# Patient Record
Sex: FEMALE | Race: WHITE | NOT HISPANIC OR LATINO | Employment: OTHER | URBAN - METROPOLITAN AREA
[De-identification: names, ages, dates, MRNs, and addresses within clinical notes are randomized per-mention and may not be internally consistent; named-entity substitution may affect disease eponyms.]

---

## 2023-09-02 PROBLEM — H00.026 INTERNAL HORDEOLUM OF LEFT EYE: Status: ACTIVE | Noted: 2023-09-02

## 2023-09-02 PROBLEM — R06.02 SHORTNESS OF BREATH: Status: ACTIVE | Noted: 2023-09-02

## 2023-09-02 PROBLEM — R35.1 NOCTURIA: Status: ACTIVE | Noted: 2023-09-02

## 2023-09-02 PROBLEM — N60.19 FIBROCYSTIC BREAST: Status: ACTIVE | Noted: 2023-09-02

## 2023-09-02 PROBLEM — H00.14 CHALAZION OF LEFT UPPER EYELID: Status: ACTIVE | Noted: 2023-09-02

## 2023-09-02 PROBLEM — R07.9 CHEST PAIN: Status: ACTIVE | Noted: 2023-09-02

## 2023-09-02 PROBLEM — E78.5 HYPERLIPIDEMIA: Status: ACTIVE | Noted: 2023-09-02

## 2023-09-02 PROBLEM — H01.00A BLEPHARITIS OF BOTH UPPER AND LOWER EYELID OF RIGHT EYE: Status: ACTIVE | Noted: 2023-09-02

## 2023-09-02 PROBLEM — G47.00 INSOMNIA: Status: ACTIVE | Noted: 2023-09-02

## 2023-09-02 PROBLEM — N95.1 MENOPAUSAL SYMPTOMS: Status: ACTIVE | Noted: 2023-09-02

## 2023-09-02 PROBLEM — M62.81 MUSCLE WEAKNESS (GENERALIZED): Status: ACTIVE | Noted: 2023-09-02

## 2023-09-02 PROBLEM — M11.262 CHONDROCALCINOSIS OF LEFT KNEE: Status: ACTIVE | Noted: 2023-09-02

## 2023-09-02 PROBLEM — M11.20 CALCIUM PYROPHOSPHATE DEPOSITION DISEASE: Status: ACTIVE | Noted: 2023-09-02

## 2023-09-02 PROBLEM — R32 URINE INCONTINENCE: Status: ACTIVE | Noted: 2023-09-02

## 2023-09-02 PROBLEM — M16.32 OSTEOARTHRITIS OF LEFT HIP JOINT DUE TO DYSPLASIA: Status: ACTIVE | Noted: 2023-09-02

## 2023-09-02 PROBLEM — R53.83 FATIGUE: Status: ACTIVE | Noted: 2023-09-02

## 2023-09-02 PROBLEM — R77.8 ABNORMAL SPEP: Status: ACTIVE | Noted: 2023-09-02

## 2023-09-02 PROBLEM — M21.70 LEG LENGTH DISCREPANCY: Status: ACTIVE | Noted: 2023-09-02

## 2023-09-02 PROBLEM — E55.9 VITAMIN D DEFICIENCY: Status: ACTIVE | Noted: 2023-09-02

## 2023-09-02 PROBLEM — K21.00 GASTROESOPHAGEAL REFLUX DISEASE WITH ESOPHAGITIS: Status: ACTIVE | Noted: 2023-09-02

## 2023-09-02 PROBLEM — H01.00B BLEPHARITIS OF BOTH UPPER AND LOWER EYELID OF LEFT EYE: Status: ACTIVE | Noted: 2023-09-02

## 2023-09-02 PROBLEM — F43.9 STRESS: Status: ACTIVE | Noted: 2023-09-02

## 2023-09-02 PROBLEM — Z96.641 STATUS POST RIGHT HIP REPLACEMENT: Status: ACTIVE | Noted: 2023-09-02

## 2023-09-02 PROBLEM — D72.819 LEUKOPENIA: Status: ACTIVE | Noted: 2023-09-02

## 2023-09-02 PROBLEM — T70.20XA MOUNTAIN SICKNESS: Status: ACTIVE | Noted: 2023-09-02

## 2023-09-02 PROBLEM — M17.12 PRIMARY OSTEOARTHRITIS OF LEFT KNEE: Status: ACTIVE | Noted: 2023-09-02

## 2023-09-02 RX ORDER — FAMOTIDINE 20 MG/1
1 TABLET, FILM COATED ORAL 2 TIMES DAILY
COMMUNITY
Start: 2019-10-02 | End: 2024-04-25

## 2023-09-02 RX ORDER — BUPROPION HYDROCHLORIDE 300 MG/1
1 TABLET ORAL DAILY
COMMUNITY
Start: 2022-04-13

## 2023-09-02 RX ORDER — ESTRADIOL 10 UG/1
10 INSERT VAGINAL 2 TIMES WEEKLY
COMMUNITY
Start: 2022-09-16 | End: 2023-12-08

## 2023-09-02 RX ORDER — ROSUVASTATIN CALCIUM 5 MG/1
1 TABLET, COATED ORAL EVERY OTHER DAY
COMMUNITY
Start: 2017-04-12

## 2023-09-02 RX ORDER — SERTRALINE HYDROCHLORIDE 25 MG/1
25 TABLET, FILM COATED ORAL DAILY
COMMUNITY
Start: 2021-04-07

## 2023-10-10 ENCOUNTER — ALLIED HEALTH (OUTPATIENT)
Dept: INTEGRATIVE MEDICINE | Facility: CLINIC | Age: 73
End: 2023-10-10
Payer: COMMERCIAL

## 2023-10-10 PROCEDURE — MASSPFU MASSAGE PACKAGE FOLLOW UP: Performed by: MASSAGE THERAPIST

## 2023-10-10 NOTE — PROGRESS NOTES
Massage Therapy Visit:     jennie Mcgill was self-referred.    Condition of Client Subjective :  Patient ID: jennie Mcgill is a 73 y.o. female who presents for reason for visit of Relaxation massage.  HPI    Session Information  Visit Type: Follow-up visit  Description of present complaint: Muscle tension, Stress    Review of Systems    Objective        Physical Exam    Actions Assessment/Plan :    Massage Treatment  Patient Position: Supine, Table, Prone  Positioning Assistance: Did not need assistance, Pillow(s)/bolster under knees while supine, Pillow(s)/bolster under anles while prone  Massage Technique: Myofascial release, Relaxation massage  Pressure Scale: 3 - Medium pressure, 4 - Moderate pressure, 5 - Deep pressure  Action Note: Relaxation massage requested, for stress management.                                                                                               50 minute full body massage, supine/prone.  Medium to firm pressure.  Relaxation protocol                                Supine:  Cervicals/upper body: kneading, stripping, palming, effleurage, petrissage, cross fiber, on SCM, SITS, levators, traps, rhomboids, upper pecs, deltoids.  UE: Stretching, cross fiber, palming, kneading, effleurage on deltoids, biceps, triceps, forearms, hands.  LE: Kneading, stripping, palming, effleurage, petrissage, cross fiber, stretching: vastus group, TFL, hamstrings... gastrocnemius, soleus, tibialis, plantar.                                              Prone:   Kneading, stripping, effleurage, petrissage, cross fiber, on erectors, longissimus, rhomboids, obliques, SI joint, paraspinals, SITS, cervicals, traps.    Response:  Post-treatment Assessment  Patient Fell Asleep During Treatment: No  Patient Noted Improvement of the Following Symptoms: Muscle tension    Evaluation:   Massage Therapy Evaluation / Recommendation(s) / Follow-up  Post-Treatment Recommendation: Increase hydration, stretching.   Cpontinue with swimming when possible, walking.  Follow-up: Follow up scheduled, 2 weeks.

## 2023-10-24 ENCOUNTER — APPOINTMENT (OUTPATIENT)
Dept: INTEGRATIVE MEDICINE | Facility: CLINIC | Age: 73
End: 2023-10-24
Payer: MEDICARE

## 2023-11-06 ENCOUNTER — ANCILLARY PROCEDURE (OUTPATIENT)
Dept: RADIOLOGY | Facility: CLINIC | Age: 73
End: 2023-11-06
Payer: MEDICARE

## 2023-11-06 VITALS — WEIGHT: 171.08 LBS | BODY MASS INDEX: 24.49 KG/M2 | HEIGHT: 70 IN

## 2023-11-06 DIAGNOSIS — Z12.31 ENCOUNTER FOR SCREENING MAMMOGRAM FOR MALIGNANT NEOPLASM OF BREAST: ICD-10-CM

## 2023-11-06 PROCEDURE — 77067 SCR MAMMO BI INCL CAD: CPT | Mod: BILATERAL PROCEDURE | Performed by: RADIOLOGY

## 2023-11-06 PROCEDURE — 77063 BREAST TOMOSYNTHESIS BI: CPT | Mod: BILATERAL PROCEDURE | Performed by: RADIOLOGY

## 2023-11-06 PROCEDURE — 77063 BREAST TOMOSYNTHESIS BI: CPT

## 2023-11-07 ENCOUNTER — ALLIED HEALTH (OUTPATIENT)
Dept: INTEGRATIVE MEDICINE | Facility: CLINIC | Age: 73
End: 2023-11-07

## 2023-11-07 PROCEDURE — MASS(60M) MASSAGE 60 MINUTES: Performed by: MASSAGE THERAPIST

## 2023-11-07 NOTE — PROGRESS NOTES
Massage Therapy Visit:     jennie Mcgill was self-referred.    Condition of Client Subjective :  Patient ID: jennie Mcgill is a 73 y.o. female who presents for reason for visit of relaxation massage.  HPI    Session Information  Visit Type: Follow-up visit  Description of present complaint: Chronic pain, Muscle tension, Stress, Discomfort      Review of Systems    Objective        Physical Exam    Actions Assessment/Plan :      Massage Treatment  Patient Position: Table, Supine, Prone  Positioning Assistance: Did not need assistance, Pillow(s)/bolster under knees while supine, Pillow(s)/bolster under anles while prone  Massage Technique: Relaxation massage, Soft tissue mobilization  Pressure Scale: 2 - Mild pressure, 3 - Medium pressure, 4 - Moderate pressure  Action Note: Relaxation massage requested for Stress Management.    50 minute full body massage, supine/prone.  Light/Medium/Firm pressure.   Relaxation protocol.    Supine:  Cervical/upper body, kneading, stripping, palming, effleurage, petrissage, cross fiber, on Platysma, SCM, SITS, Levator Scapulae, Trapezius, Rhomboids, upper Pectoralis, Deltoids.  UE, stretching, cross fiber, palming, kneading, effleurage, on Deltoids, Biceps, Triceps, forearm muscles, hand muscles.  LE, kneading, stripping, palming, knuckle, effleurage, petrissage, cross fiber, stretching, on Vastus group, TFL, lateral approach to Hip Flexors, Hamstrings, Sartorius, Gracilis, Gastrocnemius, Soleus, Tibialis, plantar/dorsal aspect of foot.  Prone, kneading, stripping, palming, knuckle, effleurage, petrissage, cross fiber, stretching, on SI joint, erectors, Longissimus, QL, Obliques, paraspinals, SITS. Cervicals, Trapezius.    Response:  Post-treatment Assessment  Patient Fell Asleep During Treatment: No  Patient Noted Improvement of the Following Symptoms: Muscle tension    Evaluation:   Massage Therapy Evaluation / Recommendation(s) / Follow-up  Post-Treatment Recommendation:  Increase hydration  Follow-up: Follow up as scheduled.

## 2023-11-22 ENCOUNTER — ALLIED HEALTH (OUTPATIENT)
Dept: INTEGRATIVE MEDICINE | Facility: CLINIC | Age: 73
End: 2023-11-22

## 2023-11-22 PROCEDURE — MASSPFU MASSAGE PACKAGE FOLLOW UP: Performed by: MASSAGE THERAPIST

## 2023-11-22 NOTE — PROGRESS NOTES
Massage Therapy Visit:     Isabella Mcgill was self-referred.    Condition of Client Subjective :  Patient ID: isabella Mcgill is a 73 y.o. female who presents for reason for visit of relaxation.  [facility]    Session Information  Visit Type: Follow-up visit  Description of present complaint: Muscle tension, Stress, Discomfort    Review of Systems    Objective        Physical Exam    Actions Assessment/Plan :    Massage Treatment  Patient Position: Table, Supine, Prone  Positioning Assistance: Did not need assistance, Pillow(s)/bolster under anles while prone, Pillow(s)/bolster under knees while supine  Massage Technique: Relaxation massage, Soft tissue mobilization, Stretching, Superficial fascial release, Myofascial release  Pressure Scale: 1 - Light pressure, 2 - Mild pressure, 3 - Medium pressure, 4 - Moderate pressure  Action Note: Relaxation massage requested for Stress Management.    50 minute full body massage, supine/prone.  Light/Medium/Firm pressure.  Therapeutic pressure. MFR techniques.  Relaxation protocol.    Supine:  Cervical/upper body, kneading, stripping, palming, effleurage, petrissage, cross fiber, on Platysma, SCM, SITS, Levator Scapulae, Trapezius, Rhomboids, upper Pectoralis, Deltoids.  UE, stretching, cross fiber, palming, kneading, effleurage, on Deltoids, Biceps, Triceps, forearm muscles, hand muscles.  LE, kneading, stripping, palming, knuckle, effleurage, petrissage, cross fiber, stretching, on Vastus group, TFL, lateral approach to Hip Flexors, Hamstrings, Sartorius, Gracilis, Gastrocnemius, Soleus, Tibialis, plantar/dorsal aspect of foot.  Prone, kneading, stripping, palming, knuckle, effleurage, petrissage, cross fiber, stretching, on SI joint, erectors, Longissimus, QL, Obliques, paraspinals, SITS. Cervical, Trapezius.    Response:  Post-treatment Assessment  Patient Fell Asleep During Treatment: No  Patient Noted Improvement of the Following Symptoms: Muscle tension, ROM    Evaluation:    Massage Therapy Evaluation / Recommendation(s) / Follow-up  Post-Treatment Recommendation: Increase hydration.  Follow-up: Follow up as scheduled.

## 2023-11-26 DIAGNOSIS — N95.2 POSTMENOPAUSAL ATROPHIC VAGINITIS: ICD-10-CM

## 2023-12-05 ENCOUNTER — ALLIED HEALTH (OUTPATIENT)
Dept: INTEGRATIVE MEDICINE | Facility: CLINIC | Age: 73
End: 2023-12-05
Payer: MEDICARE

## 2023-12-05 PROCEDURE — MASS(5P): Performed by: MASSAGE THERAPIST

## 2023-12-05 PROCEDURE — MASSPFU MASSAGE PACKAGE FOLLOW UP: Performed by: MASSAGE THERAPIST

## 2023-12-05 PROCEDURE — MASS60I MASSAGE 5 PK 60 INITIAL VISIT: Performed by: MASSAGE THERAPIST

## 2023-12-05 NOTE — PROGRESS NOTES
Massage Therapy Visit:     Isabella Mcgill was self-referred.    Condition of Client Subjective :  Patient ID: isabella Mcgill is a 73 y.o. female who presents for reason for visit of relaxation.  HPI    Session Information  Visit Type: Follow-up visit  Description of present complaint: Discomfort, Stress, Muscle tension      Review of Systems    Objective        Physical Exam    Actions Assessment/Plan :  Provider reviewed plan for the massage session, precautions and contraindications. Patient has given consent to treat with full understanding of what to expect during the session. Before massage therapy began, provider explained to the patient to communicate at any time if the pressure was causing discomfort past their tolerance level. Patient agreed to advise therapist.    Massage Treatment  Patient Position: Prone, Supine, Table  Positioning Assistance: Did not need assistance, Pillow(s)/bolster under anles while prone, Pillow(s)/bolster under knees while supine  Massage Technique: Cranio-sacral therapy, Myofascial release, Nurturing touch, Positional release, Relaxation massage, Soft tissue mobilization, Stretching  Pressure Scale: 2 - Mild pressure, 3 - Medium pressure, 4 - Moderate pressure    Response:  Post-treatment Assessment  Patient Fell Asleep During Treatment: No  Patient Noted Improvement of the Following Symptoms: Muscle tension, ROM    Evaluation:   Massage Therapy Evaluation / Recommendation(s) / Follow-up  Post-Treatment Recommendation: Increase hydration  Follow-up: Follow up as scheduled.

## 2023-12-08 RX ORDER — ESTRADIOL 10 UG/1
TABLET VAGINAL
Qty: 24 TABLET | Refills: 1 | Status: SHIPPED | OUTPATIENT
Start: 2023-12-08

## 2023-12-19 ENCOUNTER — ALLIED HEALTH (OUTPATIENT)
Dept: INTEGRATIVE MEDICINE | Facility: CLINIC | Age: 73
End: 2023-12-19

## 2023-12-19 PROCEDURE — MASSPFU MASSAGE PACKAGE FOLLOW UP: Performed by: MASSAGE THERAPIST

## 2023-12-19 NOTE — PROGRESS NOTES
Massage Therapy Visit:     Isabella Mcgill was self-referred.    Condition of Client Subjective :  Patient ID: isabella Mcgill is a 73 y.o. female who presents for reason for visit of relaxation.  HPI    Session Information  Visit Type: Follow-up visit  Description of present complaint: Muscle tension, Discomfort, Range of motion (ROM), Chronic pain      Review of Systems    Objective        Physical Exam    Actions Assessment/Plan :  Provider reviewed plan for the massage session, precautions and contraindications. Patient has given consent to treat with full understanding of what to expect during the session. Before massage therapy began, provider explained to the patient to communicate at any time if the pressure was causing discomfort past their tolerance level. Patient agreed to advise therapist.    Massage Treatment  Patient Position: Prone, Supine, Table  Positioning Assistance: Did not need assistance, Pillow(s)/bolster under anles while prone, Pillow(s)/bolster under knees while supine  Massage Technique: Stretching, Therapeutic massage, Relaxation massage, Myofascial release  Pressure Scale: 2 - Mild pressure, 3 - Medium pressure, 4 - Moderate pressure    Response:  Post-treatment Assessment  Patient Fell Asleep During Treatment: No    Evaluation:   Massage Therapy Evaluation / Recommendation(s) / Follow-up  Post-Treatment Recommendation: Increase hydration  Follow-up: Follow up as scheduled

## 2024-01-10 ENCOUNTER — APPOINTMENT (OUTPATIENT)
Dept: INTEGRATIVE MEDICINE | Facility: CLINIC | Age: 74
End: 2024-01-10

## 2024-01-24 ENCOUNTER — ALLIED HEALTH (OUTPATIENT)
Dept: INTEGRATIVE MEDICINE | Facility: CLINIC | Age: 74
End: 2024-01-24

## 2024-01-24 PROCEDURE — MASSPFU MASSAGE PACKAGE FOLLOW UP: Performed by: MASSAGE THERAPIST

## 2024-01-24 NOTE — PROGRESS NOTES
Massage Therapy Visit:     Isabella Mcgill was self-referred.    Condition of Client Subjective :  Patient ID: isabella Mcgill is a 73 y.o. female who presents for reason for visit of Stress relief.  HPI    Session Information  Visit Type: Follow-up visit  Description of present complaint: Muscle tension, Anxiety, Discomfort, Stress, Range of motion (ROM)      Review of Systems    Objective        Physical Exam    Actions Assessment/Plan :    Massage Treatment  Patient Position: Table, Supine, Prone  Positioning Assistance: Did not need assistance, Pillow(s)/bolster under anles while prone, Pillow(s)/bolster under knees while supine  Massage Technique: Cranio-sacral therapy, Mobilization, Myofascial release, Nurturing touch, Positional release, Relaxation massage, Soft tissue mobilization, Stretching, Therapeutic massage  Pressure Scale: 1 - Light pressure, 2 - Mild pressure, 3 - Medium pressure, 4 - Moderate pressure    Response:  Post-treatment Assessment  Patient Fell Asleep During Treatment: No    Evaluation:   Massage Therapy Evaluation / Recommendation(s) / Follow-up  Follow-up: Follow up as needed

## 2024-02-20 ENCOUNTER — APPOINTMENT (OUTPATIENT)
Dept: INTEGRATIVE MEDICINE | Facility: CLINIC | Age: 74
End: 2024-02-20

## 2024-03-05 ENCOUNTER — ALLIED HEALTH (OUTPATIENT)
Dept: INTEGRATIVE MEDICINE | Facility: CLINIC | Age: 74
End: 2024-03-05

## 2024-03-05 PROCEDURE — MASSPFU MASSAGE PACKAGE FOLLOW UP: Performed by: MASSAGE THERAPIST

## 2024-03-05 NOTE — PROGRESS NOTES
Massage Therapy Visit:     Isabella Mcgill was self-referred.    Condition of Client Subjective :  Patient ID: isabella Mcgill is a 73 y.o. female who presents for reason for visit of Relaxation.  HPI    Session Information  Visit Type: Follow-up visit  Description of present complaint: Stress, Muscle tension, Discomfort, Fatigue    Review of Systems    Objective        Physical Exam    Actions Assessment/Plan :    Massage Treatment  Patient Position: Table, Supine, Prone  Positioning Assistance: Pillow(s)/bolster under knees while supine, Pillow(s)/bolster under anles while prone, Did not need assistance  Massage Technique: Therapeutic massage, Stretching, Relaxation massage, Myofascial release  Pressure Scale: 2 - Mild pressure, 3 - Medium pressure, 4 - Moderate pressure  Action Note: Cervical/upper body, kneading, stripping, palming, effleurage, petrissage, cross fiber, on Platysma, SCM, SITS, Levator Scapulae, Trapezius, Rhomboids, upper Pectoralis, Deltoids.  UE, stretching, cross fiber, palming, kneading, effleurage, on Deltoids, Biceps, Triceps, forearm muscles, hand muscles.  LE, kneading, stripping, palming, knuckle, effleurage, petrissage, cross fiber, stretching, on Vastus group, TFL, lateral approach to Hip Flexors, Hamstrings, Sartorius, Gracilis, Gastrocnemius, Soleus, Tibialis, plantar/dorsal aspect of foot.  Prone, kneading, stripping, palming, knuckle, effleurage, petrissage, cross fiber, stretching, on SI joint, erectors, Longissimus, QL, Obliques, paraspinals, SITS.    Response:  Post-treatment Assessment  Patient Fell Asleep During Treatment: Yes  Patient Noted Improvement of the Following Symptoms: Muscle tension, ROM    Evaluation:   Massage Therapy Evaluation / Recommendation(s) / Follow-up  Post-Treatment Recommendation: Increase hydration  Follow-up: Follow up scheduled.

## 2024-03-11 ENCOUNTER — APPOINTMENT (OUTPATIENT)
Dept: ORTHOPEDIC SURGERY | Facility: CLINIC | Age: 74
End: 2024-03-11
Payer: MEDICARE

## 2024-03-11 ENCOUNTER — HOSPITAL ENCOUNTER (OUTPATIENT)
Dept: RADIOLOGY | Facility: CLINIC | Age: 74
Discharge: HOME | End: 2024-03-11
Payer: MEDICARE

## 2024-03-11 DIAGNOSIS — M25.552 LEFT HIP PAIN: ICD-10-CM

## 2024-03-11 PROCEDURE — 73502 X-RAY EXAM HIP UNI 2-3 VIEWS: CPT | Mod: LEFT SIDE | Performed by: RADIOLOGY

## 2024-03-11 PROCEDURE — 73502 X-RAY EXAM HIP UNI 2-3 VIEWS: CPT | Mod: LT

## 2024-03-18 ENCOUNTER — OFFICE VISIT (OUTPATIENT)
Dept: ORTHOPEDIC SURGERY | Facility: CLINIC | Age: 74
End: 2024-03-18
Payer: MEDICARE

## 2024-03-18 VITALS — HEIGHT: 69 IN | BODY MASS INDEX: 23.7 KG/M2 | WEIGHT: 160 LBS

## 2024-03-18 DIAGNOSIS — Z96.641 HISTORY OF TOTAL HIP ARTHROPLASTY, RIGHT: Primary | ICD-10-CM

## 2024-03-18 DIAGNOSIS — M25.552 LEFT HIP PAIN: ICD-10-CM

## 2024-03-18 PROCEDURE — 99215 OFFICE O/P EST HI 40 MIN: CPT | Performed by: STUDENT IN AN ORGANIZED HEALTH CARE EDUCATION/TRAINING PROGRAM

## 2024-03-18 PROCEDURE — 1159F MED LIST DOCD IN RCRD: CPT | Performed by: STUDENT IN AN ORGANIZED HEALTH CARE EDUCATION/TRAINING PROGRAM

## 2024-03-18 PROCEDURE — 1036F TOBACCO NON-USER: CPT | Performed by: STUDENT IN AN ORGANIZED HEALTH CARE EDUCATION/TRAINING PROGRAM

## 2024-03-18 NOTE — PROGRESS NOTES
JOHN/TKA Related Summary           L hip: N  L knee: N  R hip  ~2015: Primary JOHN (Dr. Robles at ). Doing well.  R knee: N  Lumbar surgery or significant pathology: N            CC/SUBJECTIVE/HPI            PCP: Kishan Boudreaux MD  Referring provider: No ref. provider found  Occupation: Retired  Hobbies: Read, walking, swimming, exercising  Isabella Mcgill is a very pleasant 73 y.o. female presenting for L hip pain.  She has a surgical history notable for a right total hip arthroplasty about 10 years ago that is doing well.  This time around, the pain is similar in nature and location in her groin but is not as severe.  She has been using over-the-counter medications with some relief.    L hip  Symptoms  Pain: 4/10  Onset: chronic/gradual  Duration: 3mo-1yr  Location: groin  Quality: dull/ache  Limitations: pain after activity and limp  Ambulation limit due to pain: unlimited but hurts later  Other symptoms: none  Treatment  Tried: activity modification, home exercises, ice/heat, and OTCs  Most recent injection: none  Assistive device: none  Treatment attempted for 3mo-1yr and is partially effective            HISTORIES (System Generated and Pt Reported on Form Today)       Dental  Pt reported: No active issues     PMH  Pt reported: Denies heart/lung/kidney/liver issues, DM, stroke, seizure, bariatric surgery, anticoag, MRSA, cancer, personal/familial coagulopathies except: none in addition to below  System generated (PMH, problem list both included since EMR change caused discrepancies):   Past Medical History:   Diagnosis Date    Asymptomatic menopausal state 03/30/2018    Post-menopausal    Encounter for gynecological examination (general) (routine) without abnormal findings 02/23/2021    Well woman exam with routine gynecological exam    Personal history of other specified conditions 10/02/2019    History of dysphagia    Personal history of other specified conditions     History of fibrocystic disease of  breast    Personal history of pneumonia (recurrent)     History of pneumococcal pneumonia     Patient Active Problem List   Diagnosis    Abnormal SPEP    Blepharitis of both upper and lower eyelid of left eye    Blepharitis of both upper and lower eyelid of right eye    Calcium pyrophosphate deposition disease    Chalazion of left upper eyelid    Chest pain    Chondrocalcinosis of left knee    Primary osteoarthritis of left knee    Fatigue    Fibrocystic breast    Gastroesophageal reflux disease with esophagitis    Hyperlipidemia    Insomnia    Internal hordeolum of left eye    Leg length discrepancy    Leukopenia    Menopausal symptoms    Mountain sickness    Muscle weakness (generalized)    Nocturia    Osteoarthritis of left hip joint due to dysplasia    Shortness of breath    Status post right hip replacement    Stress    Urine incontinence    Vitamin D deficiency     PSH  Pt reported: Per above.   System generated:   Past Surgical History:   Procedure Laterality Date    APPENDECTOMY  03/30/2018    Appendectomy    MR HEAD ANGIO WO IV CONTRAST  10/1/2021    MR HEAD ANGIO WO IV CONTRAST 10/1/2021 [facility]    MR NECK ANGIO WO IV CONTRAST  10/1/2021    MR NECK ANGIO WO IV CONTRAST 10/1/2021 [facility]    OTHER SURGICAL HISTORY  02/13/2019    Hip surgery    OTHER SURGICAL HISTORY  08/14/2019    Rhinologic surgery    OTHER SURGICAL HISTORY  08/14/2019    [place] tooth extraction    TONSILLECTOMY  03/30/2018    Tonsillectomy     Family Hx  Pt reported clot/coagulopathies: none  System generated:   Family History   Problem Relation Name Age of Onset    Cataracts Mother      Other (Myositis) Father      Thyroid cancer Sister       Social Hx  Pt reported substance use: N  System generated:      Allergies  Pt reported (meds, metals): per below  System generated:   Allergies   Allergen Reactions    Esomeprazole Headache    Ibuprofen Itching     Current Meds  System generated:   Current Outpatient  "Medications:     buPROPion XL (Wellbutrin XL) 300 mg 24 hr tablet, Take 1 tablet (300 mg) by mouth once daily., Disp: , Rfl:     famotidine (Pepcid) 20 mg tablet, Take 1 tablet (20 mg) by mouth 2 times a day., Disp: , Rfl:     rosuvastatin (Crestor) 5 mg tablet, Take 1 tablet (5 mg) by mouth every other day., Disp: , Rfl:     sertraline (Zoloft) 25 mg tablet, Take 1 tablet (25 mg) by mouth once daily., Disp: , Rfl:     Yuvafem 10 mcg tablet vaginal tablet, INSERT 1 TABLET PER VAGINA TWICE WEEKLY, Disp: 24 tablet, Rfl: 1    ROS: Neg except HPI            OBJECTIVE            Physical exam  Estimated body mass index is 24.55 kg/m² as calculated from the following:    Height as of 11/6/23: 1.778 m (5' 10\").    Weight as of 11/6/23: 77.6 kg (171 lb 1.2 oz).  Gen/psych: NAD, conversational, appropriate    Ambulation  Gait: normal  Assistive device: none  Spine  Lumbar tenderness: neg  Limited ROM: neg, with no radiation or pain with flex/ex, lateral bending  SLR test: neg    Focused MSK exam: L  Hip  Trendelenberg test: neg  Tenderness: none  Pain with log roll: neg  Stinchfield: neg  ROM: within expected range, nonpainful except at extremes  Flexion: 110º  IR: 10º  ER: 40º  Abd: 30º  Neurovascular    Strength: 5/5 hip/knee/ankle flexion and extension  Sensory (L2-S1): SILT throughout lower extremity  Edema/stasis: no pitting edema  Pulse: DP 1+, PT 1+    Focused MSK exam: R  JOHN  Trendelenberg test: neg  Skin/incision: well healed, likely posterior approach  Tenderness: none  Pain with log roll: neg  ROM: within expected range, nonpainful   Neurovascular    Strength: 5/5 hip/knee/ankle flexion and extension  Sensory (L2-S1): SILT throughout lower extremity  Edema/stasis: no pitting edema  Pulse: DP 1+, PT 1+    Leg lengths: Objectively and subjectively R 1cm longer than L     Imaging  3/18/2024 L hip radiographs (AP Pelvis, AP/Lateral) on my read: Joint space narrowing (mild) with mild undercoverage/dysplasia.  5/4/2022 " R hip radiographs (AP Pelvis, AP/Lateral) on my read: JOHN components in acceptable position with no sign of gross implant/hardware failure.            ASSESSMENT & PLAN           Patient verbalized understanding of below A&P. All questions answered.  R Primary JOHN (Dr. Robles, ~2015), doing well  Imaging and outcomes reviewed with pt.  Activity/Therapy/Incision: Continue low impact exercise and weight mgmt.  Pain: OTCs, ice as needed.  Follow-up: 5yrs from most recent xrays with repeat XRs (2027)  L hip DJD  Differential includes radicular pain from spine. H&P most consistent with intra-articular pain from hip degeneration.  General information  Evaluation, imaging, diagnosis, and treatment options (conservative and surgical as well as risks, benefits, recovery, and outcomes) discussed. Conservative options should be maximized and include activity modification, bracing, weight loss, PT, home exercise, local pain control (ice, heat, topicals), OTCs, and assistive devices. Once exhausted, injections may provide relief. If these fail to improve pain, function, and quality of life, elective arthroplasty may be an option.  Shared decision making   At this point, Isabella's pain has been reasonably controlled with over-the-counter medications.  Thus, we will continue this conservative treatment.  If she does progress to the point of desiring a corticosteroid injection, she can call the office for referral and does not need to return for an appointment.  PMH, PSH, other considerations discussed  SDD: No absolute criteria exist, but pt meets general guidelines (BMI<40, no more than cane preop, no recent falls, home help, no cardopulm dz requiring postop monitoring, no untreated FATIMAH, no hx anesthesia issues, <=76yo)  Chondrocalcinosis  Leg length discrepancy  Listed in EMR but not endorsed by patient today: Generalized muscle weakness, leukopenia (WBC 4.7 on 10/12/2023)  Follow-up: As needed.

## 2024-03-19 ENCOUNTER — ALLIED HEALTH (OUTPATIENT)
Dept: INTEGRATIVE MEDICINE | Facility: CLINIC | Age: 74
End: 2024-03-19

## 2024-03-19 PROCEDURE — MASSPFU MASSAGE PACKAGE FOLLOW UP: Performed by: MASSAGE THERAPIST

## 2024-03-19 NOTE — PROGRESS NOTES
Massage Therapy Visit:     Isabella Mcgill was self-referred.    Condition of Client Subjective :  Patient ID: isabella Mcgill is a 73 y.o. female who presents for reason for visit of Relaxation.  HPI    Session Information  Visit Type: Follow-up visit  Description of present complaint: Muscle tension, Discomfort, Stress, Fatigue, Range of motion (ROM)    Review of Systems    Objective        Physical Exam    Actions Assessment/Plan :    Massage Treatment  Patient Position: Table, Supine, Prone  Positioning Assistance: Pillow(s)/bolster under knees while supine, Pillow(s)/bolster under anles while prone, Did not need assistance  Massage Technique: Therapeutic massage, Relaxation massage, Myofascial release, Soft tissue mobilization, Stretching  Pressure Scale: 2 - Mild pressure, 3 - Medium pressure, 4 - Moderate pressure  Action Note: Cervical/upper body, kneading, stripping, palming, effleurage, petrissage, cross fiber, on SCM, SITS, Levator Scapulae, Trapezius, Rhomboids, Deltoids.  UE, stretching, cross fiber, palming, kneading, effleurage, on Deltoids, Biceps, Triceps, forearm muscles, hand muscles.  LE, kneading, stripping, palming, knuckle, effleurage, petrissage, cross fiber, stretching, on Vastus group, TFL, Hamstrings, Sartorius, Gracilis, Gastrocnemius, Soleus, Tibialis, plantar/dorsal aspect of foot.  Prone, kneading, stripping, palming, knuckle, effleurage, petrissage, cross fiber, stretching, on erectors, Longissimus, QL, Obliques, paraspinals, SITS.    Response:  Post-treatment Assessment  Patient Fell Asleep During Treatment: Yes  Patient Noted Improvement of the Following Symptoms: Muscle tension    Evaluation:   Massage Therapy Evaluation / Recommendation(s) / Follow-up  Post-Treatment Recommendation: Increase hydration  Follow-up: Follow up scheduled.

## 2024-04-03 ENCOUNTER — APPOINTMENT (OUTPATIENT)
Dept: INTEGRATIVE MEDICINE | Facility: CLINIC | Age: 74
End: 2024-04-03

## 2024-04-16 ENCOUNTER — ALLIED HEALTH (OUTPATIENT)
Dept: INTEGRATIVE MEDICINE | Facility: CLINIC | Age: 74
End: 2024-04-16

## 2024-04-16 PROCEDURE — MASS60I MASSAGE 5 PK 60 INITIAL VISIT: Performed by: MASSAGE THERAPIST

## 2024-04-16 NOTE — PROGRESS NOTES
Massage Therapy Visit:     Isabella Mcgill was self-referred.    Condition of Client Subjective :  Patient ID: isabella Mcgill is a 73 y.o. female who presents for reason for visit of Relaxation massage.  HPI    Session Information  Visit Type: Follow-up visit  Description of present complaint: Fatigue, Stress, Anxiety, Range of motion (ROM), Discomfort    Review of Systems    Objective   Pre-treatment Assessment  Condition of Client Note: Tension felt on neck, shoulders, Lumbar.    Physical Exam    Actions Assessment/Plan :    Massage Treatment  Patient Position: Table, Supine, Prone  Positioning Assistance: Did not need assistance, Pillow(s)/bolster under knees while supine, Pillow(s)/bolster under anles while prone  Massage Technique: Therapeutic massage, Superficial fascial release, Myofascial release, Stretching, Soft tissue mobilization, Relaxation massage  Pressure Scale: 2 - Mild pressure, 3 - Medium pressure, 4 - Moderate pressure  Action Note: Cervical/upper body, kneading, stripping, palming, effleurage, petrissage, cross fiber, on scalp, facials, Cervicals, Platysma, SCM, SITS, Levator Scapulae, Trapezius, Rhomboids, upper Pectoralis, Deltoids.  UE, stretching, cross fiber, palming, kneading, effleurage, on Deltoids, Biceps, Triceps, forearm muscles, hand muscles.  LE, kneading, stripping, palming, knuckle, effleurage, petrissage, cross fiber, stretching, on Vastus group, TFL, lateral approach to Hip Flexors, Lumbars, QL, Hamstrings, Sartorius, Gracilis, Gastrocnemius, Soleus, Tibialis, plantar/dorsal aspect of foot.  Prone, kneading, stripping, palming, knuckle, effleurage, petrissage, cross fiber, stretching, on SI joint, erectors, Longissimus, QL, Obliques, paraspinals, SITS.    Response:  Post-treatment Assessment  Patient Fell Asleep During Treatment: Yes  Patient Noted Improvement of the Following Symptoms: Muscle tension, ROM    Evaluation:   Massage Therapy Evaluation / Recommendation(s) /  Follow-up  Post-Treatment Recommendation: Increase hydration  Follow-up: Follow up scheduled.

## 2024-04-25 DIAGNOSIS — K21.00 GASTRO-ESOPHAGEAL REFLUX DISEASE WITH ESOPHAGITIS, WITHOUT BLEEDING: ICD-10-CM

## 2024-04-25 RX ORDER — FAMOTIDINE 20 MG/1
20 TABLET, FILM COATED ORAL 2 TIMES DAILY
Qty: 180 TABLET | Refills: 0 | Status: SHIPPED | OUTPATIENT
Start: 2024-04-25

## 2024-04-30 ENCOUNTER — ALLIED HEALTH (OUTPATIENT)
Dept: INTEGRATIVE MEDICINE | Facility: CLINIC | Age: 74
End: 2024-04-30

## 2024-04-30 PROCEDURE — MASSPFU MASSAGE PACKAGE FOLLOW UP: Performed by: MASSAGE THERAPIST

## 2024-04-30 NOTE — PROGRESS NOTES
Massage Therapy Visit:     Isabella Mcgill was self-referred.    Condition of Client Subjective :  Patient ID: isabella Mcgill is a 74 y.o. female who presents for reason for visit of Relaxation massage..  \[facility] [place] [facility]\""    Session Information  Visit Type: Follow-up visit  Description of present complaint: Stress, Range of motion (ROM), Muscle tension, Discomfort    Review of Systems    Objective   Pre-treatment Assessment  Condition of Client Note: Relaxation massage requested    Physical Exam    Actions Assessment/Plan :    Massage Treatment  Patient Position: Supine, Table  Positioning Assistance: Did not need assistance, Pillow(s)/bolster under knees while supine  Massage Technique: Cranio-sacral therapy, Fascial release, Mobilization, Myofascial release, Superficial fascial release, Relaxation massage, Soft tissue mobilization  Pressure Scale: 2 - Mild pressure, 3 - Medium pressure, 4 - Moderate pressure  Action Note: ** NO LOTION, MFR TECHNIQUES for this session**    Cervical/upper body, kneading, stripping, palming, effleurage, petrissage, cross fiber, on Platysma, SCM, SITS, Levator Scapulae, Trapezius, Rhomboids, upper Pectoralis, Deltoids.  UE, stretching, cross fiber, palming, kneading, effleurage, on Deltoids, Biceps, Triceps, forearm muscles, hand muscles.  LE, kneading, stripping, palming, knuckle, effleurage, petrissage, cross fiber, stretching, on Vastus group, TFL, lateral approach to Hip Flexors, Hamstrings, Sartorius, Gracilis, Gastrocnemius, Soleus, Tibialis, plantar/dorsal aspect of foot.    Response:  Post-treatment Assessment  Patient Noted Improvement of the Following Symptoms: Muscle tension  Response Note: Isabella felt very relaxed, without stress after this session    Evaluation:   Massage Therapy Evaluation / Recommendation(s) / Follow-up  Post-Treatment Recommendation: Increase hydration  Follow-up: Follow up scheduled

## 2024-05-15 ENCOUNTER — ALLIED HEALTH (OUTPATIENT)
Dept: INTEGRATIVE MEDICINE | Facility: CLINIC | Age: 74
End: 2024-05-15

## 2024-05-15 PROCEDURE — MASSPFU MASSAGE PACKAGE FOLLOW UP: Performed by: MASSAGE THERAPIST

## 2024-05-15 NOTE — PROGRESS NOTES
Massage Therapy Visit:     Isabella Mcgill was self-referred.    Condition of Client Subjective :  Patient ID: isabella Mcgill is a 74 y.o. female who presents for reason for visit of Relaxation massage.  HPI    Session Information  Visit Type: Follow-up visit  Description of present complaint: Muscle tension, Discomfort, Stress    Review of Systems    Objective        Physical Exam    Actions Assessment/Plan   Massage Treatment  Patient Position: Table, Supine, Prone  Positioning Assistance: Pillow(s)/bolster under knees while supine, Did not need assistance  Pressure Scale: 2 - Mild pressure, 3 - Medium pressure, 4 - Moderate pressure  Action Note: Cervical/upper body, kneading, stripping, palming, effleurage, petrissage, cross fiber, on Platysma, SCM, SITS, Levator Scapulae, Trapezius, Rhomboids, upper Pectoralis, Deltoids.  UE, stretching, cross fiber, palming, kneading, effleurage, on Deltoids, Biceps, Triceps, forearm muscles, hand muscles.  LE, kneading, stripping, palming, knuckle, effleurage, petrissage, cross fiber, stretching, on Vastus group, TFL, lateral approach to Hip Flexors, Hamstrings, Sartorius, Gracilis, Gastrocnemius, Soleus, Tibialis, plantar/dorsal aspect of foot.    Response:       Evaluation:   Massage Therapy Evaluation / Recommendation(s) / Follow-up  Post-Treatment Recommendation: Increase hydration  Follow-up: Follow up scheduled

## 2024-05-22 ENCOUNTER — APPOINTMENT (OUTPATIENT)
Dept: INTEGRATIVE MEDICINE | Facility: CLINIC | Age: 74
End: 2024-05-22

## 2024-05-28 ENCOUNTER — APPOINTMENT (OUTPATIENT)
Dept: INTEGRATIVE MEDICINE | Facility: CLINIC | Age: 74
End: 2024-05-28

## 2024-05-29 ENCOUNTER — ALLIED HEALTH (OUTPATIENT)
Dept: INTEGRATIVE MEDICINE | Facility: CLINIC | Age: 74
End: 2024-05-29

## 2024-05-29 PROCEDURE — MASSPFU MASSAGE PACKAGE FOLLOW UP: Performed by: MASSAGE THERAPIST

## 2024-05-29 NOTE — PROGRESS NOTES
Massage Therapy Visit:     Isabella Mcgill was self-referred.    Condition of Client Subjective :  Patient ID: isabella Mcgill is a 74 y.o. female who presents for reason for visit of Relaxation massage.  HPI    Session Information  Visit Type: Follow-up visit  Description of present complaint: Chronic pain, Muscle tension, Range of motion (ROM), Discomfort, Stress    Review of Systems    Objective        Physical Exam    Actions Assessment/Plan :    Massage Treatment  Patient Position: Table, Supine, Prone  Positioning Assistance: Did not need assistance, Pillow(s)/bolster under knees while supine, Pillow(s)/bolster under anles while prone  Massage Technique: Therapeutic massage, Relaxation massage, Myofascial release, Superficial fascial release, Stretching, Soft tissue mobilization  Pressure Scale: 2 - Mild pressure, 3 - Medium pressure, 4 - Moderate pressure  Action Note: Cervical/upper body, kneading, stripping, palming, effleurage, petrissage, cross fiber, on facials, scalp, occipitals, Platysma, SCM, SITS, Levator Scapulae, Trapezius, Rhomboids, upper Pectoralis, Deltoids.  UE, stretching, cross fiber, palming, kneading, effleurage, on Deltoids, Biceps, Triceps, forearm muscles, hand muscles.  LE, kneading, stripping, palming, knuckle, effleurage, petrissage, cross fiber, stretching, on Vastus group, TFL, lateral approach to Hip Flexors, Hamstrings, Sartorius, Gracilis, Gastrocnemius, Soleus, Tibialis, plantar/dorsal aspect of foot.  Prone, kneading, stripping, palming, knuckle, effleurage, petrissage, cross fiber, stretching, on SI joint, erectors, Longissimus, QL, Obliques, paraspinals, SITS, Cervicals, Trapezius.    Response:  Post-treatment Assessment  Patient Noted Improvement of the Following Symptoms: Muscle tension, ROM    Evaluation:   Massage Therapy Evaluation / Recommendation(s) / Follow-up  Post-Treatment Recommendation: Increase hydration  Follow-up: Follow up scheduled.

## 2024-06-12 ENCOUNTER — APPOINTMENT (OUTPATIENT)
Dept: INTEGRATIVE MEDICINE | Facility: CLINIC | Age: 74
End: 2024-06-12

## 2024-06-12 PROCEDURE — MASSPFU MASSAGE PACKAGE FOLLOW UP: Performed by: MASSAGE THERAPIST

## 2024-06-12 NOTE — PROGRESS NOTES
Massage Therapy Visit:     Isabella Mcgill was self-referred.    Condition of Client Subjective :  Patient ID: isabella Mcgill is a 74 y.o. female who presents for reason for visit of Relaxation massage.  HPI    Session Information  Visit Type: Follow-up visit  Description of present complaint: Stress, Discomfort, Muscle tension, Fatigue    Review of Systems    Objective        Physical Exam    Actions Assessment/Plan :    Massage Treatment  Patient Position: Table, Supine, Prone  Positioning Assistance: Did not need assistance, Pillow(s)/bolster under knees while supine, Pillow(s)/bolster under anles while prone  Massage Technique: Relaxation massage, Superficial fascial release, Mobilization, Stretching, Soft tissue mobilization  Pressure Scale: 2 - Mild pressure, 3 - Medium pressure, 4 - Moderate pressure    Response:       Evaluation:   Massage Therapy Evaluation / Recommendation(s) / Follow-up  Post-Treatment Recommendation: Increase hydration  Follow-up: Follow up scheduled

## 2024-06-26 ENCOUNTER — APPOINTMENT (OUTPATIENT)
Dept: INTEGRATIVE MEDICINE | Facility: CLINIC | Age: 74
End: 2024-06-26

## 2024-06-26 PROCEDURE — MASS60I MASSAGE 5 PK 60 INITIAL VISIT: Performed by: MASSAGE THERAPIST

## 2024-06-26 NOTE — PROGRESS NOTES
Massage Therapy Visit:     Isabella Mcgill was self-referred.    Condition of Client Subjective :  Patient ID: isabella Mcgill is a 74 y.o. female who presents for reason for visit of Relaxation massage  .  HPI    Session Information  Visit Type: Follow-up visit  Description of present complaint: Stress, Range of motion (ROM), Muscle tension, Discomfort, Fatigue    Review of Systems    Objective        Physical Exam    Actions Assessment/Plan :    Massage Treatment  Patient Position: Table, Supine, Prone  Positioning Assistance: Did not need assistance, Pillow(s)/bolster under knees while supine, Pillow(s)/bolster under anles while prone  Massage Technique: Therapeutic massage, Relaxation massage, Myofascial release, Mobilization, Superficial fascial release, Stretching  Pressure Scale: 2 - Mild pressure, 3 - Medium pressure, 4 - Moderate pressure    Response:  Post-treatment Assessment  Patient Noted Improvement of the Following Symptoms: Muscle tension    Evaluation:   Massage Therapy Evaluation / Recommendation(s) / Follow-up  Post-Treatment Recommendation: Increase hydration  Follow-up: Follow up scheduled.

## 2024-07-16 ENCOUNTER — APPOINTMENT (OUTPATIENT)
Dept: INTEGRATIVE MEDICINE | Facility: CLINIC | Age: 74
End: 2024-07-16

## 2024-07-16 PROCEDURE — MASS60I MASSAGE 5 PK 60 INITIAL VISIT: Performed by: MASSAGE THERAPIST

## 2024-07-16 NOTE — PROGRESS NOTES
Massage Therapy Visit:     Isabella Mcgill was self-referred.    Condition of Client Subjective :  Patient ID: isabella Mcgill is a 74 y.o. female who presents for reason for visit of stress, relaxation.    HPI: stress, pt grieving over recent passing of .    Session Information  Visit Type: Follow-up visit  Description of present complaint: Stress, Muscle tension    Objective   Pre-treatment Assessment  Arrival Mode: Ambulatory  Treatment Precautions: None    Actions Assessment/Plan :  Provider reviewed plan for the massage session, precautions and contraindications. Patient/guardian/hospital staff has given consent to treat with full understanding of what to expect during the session. Before massage therapy began, provider explained to the patient to communicate at any time if the pressure was causing discomfort past their tolerance level. Patient agreed to advise therapist.    Massage Treatment  Denies Allergy: Patient denies allergy to topical lubricant  Patient Position: Table, Supine, Prone  Positioning Assistance: Pillow(s)/bolster under knees while supine, Pillow(s)/bolster under anles while prone  Massage Technique: Therapeutic massage  Area/Body Region: upper and lower body, supine and prone  Pressure Scale: 2 - Mild pressure, 3 - Medium pressure    Response:   The tissue treated softened nicely with therapeutic massage. The pt was relaxed and comfortable during the treatment.    Evaluation:

## 2024-07-23 ENCOUNTER — APPOINTMENT (OUTPATIENT)
Dept: INTEGRATIVE MEDICINE | Facility: CLINIC | Age: 74
End: 2024-07-23

## 2024-07-23 PROCEDURE — MASSPFU MASSAGE PACKAGE FOLLOW UP: Performed by: MASSAGE THERAPIST

## 2024-07-23 NOTE — PROGRESS NOTES
Massage Therapy Visit:     Isabella Mcgill was self-referred.    Condition of Client Subjective :  Patient ID: isabella Mcgill is a 74 y.o. female who presents for reason for visit of Relaxation massage.  HPI    Session Information  Description of present complaint: Stress, Muscle tension, Discomfort, Range of motion (ROM), Fatigue    Review of Systems    Objective   Pre-treatment Assessment  Condition of Client Note: Requested focus on scalp, L obliques/QL/abdominals.    Physical Exam    Actions Assessment/Plan :    Massage Treatment  Patient Position: Table, Supine, Prone  Positioning Assistance: Did not need assistance, Pillow(s)/bolster under knees while supine, Pillow(s)/bolster under anles while prone  Massage Technique: Therapeutic massage, Relaxation massage, Myofascial release, Mobilization, Superficial fascial release, Stretching  Pressure Scale: 2 - Mild pressure, 3 - Medium pressure, 4 - Moderate pressure  Action Note: **Lateral approach to L obliques, L QL, L abdominals, as requested.. medium to firm pressure on these muscles, to start this session. Cervical/upper body, kneading, stripping, palming, effleurage, petrissage, cross fiber, on facials, scalp, occipitals, Platysma, SCM, SITS, Levator Scapulae, Trapezius, Rhomboids, upper Pectoralis, Deltoids. UE, stretching, cross fiber, palming, kneading, effleurage, on Deltoids, Biceps, Triceps, forearm muscles, hand muscles. LE, kneading, stripping, palming, knuckle, effleurage, petrissage, cross fiber, stretching, on Vastus group, TFL, lateral approach to Hip Flexors, Hamstrings, Sartorius, Gracilis, Gastrocnemius, Soleus, Tibialis, plantar/dorsal aspect of foot. Prone, kneading, stripping, palming, knuckle, effleurage, petrissage, cross fiber, stretching, on SI joint, erectors, Longissimus, QL, Obliques, paraspinals, SITS, Cervicals, Trapezius, scalp.    Response:  Post-treatment Assessment  Patient Noted Improvement of the Following Symptoms: Muscle  tension, ROM    Evaluation:   Massage Therapy Evaluation / Recommendation(s) / Follow-up  Post-Treatment Recommendation: Increase hydration  Follow-up: Follow up scheduled

## 2024-08-07 ENCOUNTER — APPOINTMENT (OUTPATIENT)
Dept: INTEGRATIVE MEDICINE | Facility: CLINIC | Age: 74
End: 2024-08-07

## 2024-08-07 PROCEDURE — MASSPFU MASSAGE PACKAGE FOLLOW UP: Performed by: MASSAGE THERAPIST

## 2024-08-07 NOTE — PROGRESS NOTES
Massage Therapy Visit:     Isabella Mcgill was self-referred.    Condition of Client Subjective :  Patient ID: isabella Mcgill is a 74 y.o. female who presents for reason for visit of Relaxation massage.  HPI    Session Information  Description of present complaint: Discomfort, Fatigue, Range of motion (ROM), Muscle tension, Stress    Review of Systems    Objective        Physical Exam    Actions Assessment/Plan :    Massage Treatment  Patient Position: Table, Supine, Prone  Positioning Assistance: Did not need assistance, Pillow(s)/bolster under knees while supine, Pillow(s)/bolster under anles while prone  Massage Technique: Therapeutic massage, Superficial fascial release, Myofascial release, Stretching, Soft tissue mobilization, Fascial release  Pressure Scale: 2 - Mild pressure, 3 - Medium pressure, 4 - Moderate pressure    Response:  Post-treatment Assessment  Patient Noted Improvement of the Following Symptoms: Muscle tension, ROM    Evaluation:   Massage Therapy Evaluation / Recommendation(s) / Follow-up  Post-Treatment Recommendation: Increase hydration  Follow-up: Follow up scheduled        
PAST SURGICAL HISTORY:  H/O thyroidectomy 2017    S/P lumpectomy, left breast

## 2024-08-21 ENCOUNTER — APPOINTMENT (OUTPATIENT)
Dept: INTEGRATIVE MEDICINE | Facility: CLINIC | Age: 74
End: 2024-08-21

## 2024-09-04 ENCOUNTER — APPOINTMENT (OUTPATIENT)
Dept: INTEGRATIVE MEDICINE | Facility: CLINIC | Age: 74
End: 2024-09-04

## 2024-09-04 PROCEDURE — MASSPFU MASSAGE PACKAGE FOLLOW UP: Performed by: MASSAGE THERAPIST

## 2024-09-04 NOTE — PROGRESS NOTES
Massage Therapy Visit:     Isabella Mcgill was self-referred.    Condition of Client Subjective :  Patient ID: Isabella Mcgill is a 74 y.o. female who presents for reason for visit of Relaxation massage.  HPI    Session Information  Description of present complaint: Discomfort, Fatigue, Range of motion (ROM), Muscle tension, Stress      Review of Systems    Objective        Physical Exam    Actions Assessment/Plan :    Massage Treatment  Patient Position: Table, Prone, Supine  Positioning Assistance: Did not need assistance, Pillow(s)/bolster under knees while supine, Pillow(s)/bolster under anles while prone  Massage Technique: Therapeutic massage, Nurturing touch, Superficial fascial release, Stretching, Soft tissue mobilization, Relaxation massage  Pressure Scale: 2 - Mild pressure, 3 - Medium pressure, 4 - Moderate pressure    Response:  Post-treatment Assessment  Patient Noted Improvement of the Following Symptoms: Muscle tension    Evaluation:   Massage Therapy Evaluation / Recommendation(s) / Follow-up  Post-Treatment Recommendation: Increase hydration  Follow-up: Follow up scheduled

## 2024-09-18 ENCOUNTER — APPOINTMENT (OUTPATIENT)
Dept: INTEGRATIVE MEDICINE | Facility: CLINIC | Age: 74
End: 2024-09-18

## 2024-09-18 PROCEDURE — MASSPFU MASSAGE PACKAGE FOLLOW UP: Performed by: MASSAGE THERAPIST

## 2024-09-18 NOTE — PROGRESS NOTES
Massage Therapy Visit:     Isabella Mcgill was self-referred.    Condition of Client Subjective :  Patient ID: isabella Mcgill is a 74 y.o. female who presents for reason for visit of Relaxation massage.  HPI    Session Information  Description of present complaint: Discomfort, Muscle tension, Stress, Range of motion (ROM)      Review of Systems    Objective        Physical Exam    Actions Assessment/Plan :    Massage Treatment  Patient Position: Table, Supine, Prone  Positioning Assistance: Did not need assistance, Pillow(s)/bolster under knees while supine, Pillow(s)/bolster under anles while prone  Massage Technique: Therapeutic massage, Nurturing touch, Superficial fascial release, Myofascial release, Stretching, Soft tissue mobilization, Relaxation massage  Pressure Scale: 2 - Mild pressure, 3 - Medium pressure, 4 - Moderate pressure    Response:  Post-treatment Assessment  Patient Noted Improvement of the Following Symptoms: Muscle tension    Evaluation:   Massage Therapy Evaluation / Recommendation(s) / Follow-up  Post-Treatment Recommendation: Increase hydration  Follow-up: Follow up scheduled

## 2024-10-02 ENCOUNTER — APPOINTMENT (OUTPATIENT)
Dept: INTEGRATIVE MEDICINE | Facility: CLINIC | Age: 74
End: 2024-10-02

## 2024-10-15 ENCOUNTER — APPOINTMENT (OUTPATIENT)
Dept: INTEGRATIVE MEDICINE | Facility: CLINIC | Age: 74
End: 2024-10-15

## 2024-10-16 ENCOUNTER — APPOINTMENT (OUTPATIENT)
Dept: INTEGRATIVE MEDICINE | Facility: CLINIC | Age: 74
End: 2024-10-16

## 2024-10-30 ENCOUNTER — APPOINTMENT (OUTPATIENT)
Dept: INTEGRATIVE MEDICINE | Facility: CLINIC | Age: 74
End: 2024-10-30

## 2024-11-05 ENCOUNTER — ALLIED HEALTH (OUTPATIENT)
Dept: INTEGRATIVE MEDICINE | Facility: CLINIC | Age: 74
End: 2024-11-05

## 2024-11-05 PROCEDURE — MASSPFU MASSAGE PACKAGE FOLLOW UP: Performed by: MASSAGE THERAPIST

## 2024-11-05 NOTE — PROGRESS NOTES
Massage Therapy Visit:     Isabella Mcgill was self-referred.    Condition of Client Subjective :  Patient ID: isabella Mcgill is a 74 y.o. female who presents for reason for visit of Relaxation Massage.  HPI    Session Information  Description of present complaint: Discomfort, Stress, Muscle tension, Gait issues    Review of Systems    Objective        Physical Exam    Actions Assessment/Plan :    Massage Treatment  Patient Position: Table, Supine, Prone  Positioning Assistance: Did not need assistance, Pillow(s)/bolster under knees while supine, Pillow(s)/bolster under anles while prone  Massage Technique: Therapeutic massage, Nurturing touch, Superficial fascial release, Myofascial release, Stretching, Soft tissue mobilization, Relaxation massage, Cranio-sacral therapy  Pressure Scale: 2 - Mild pressure, 3 - Medium pressure, 4 - Moderate pressure    Response:  Post-treatment Assessment  Patient Noted Improvement of the Following Symptoms: Muscle tension    Evaluation:   Massage Therapy Evaluation / Recommendation(s) / Follow-up  Post-Treatment Recommendation: Increase hydration  Follow-up: Follow up scheduled

## 2024-11-12 ENCOUNTER — APPOINTMENT (OUTPATIENT)
Dept: INTEGRATIVE MEDICINE | Facility: CLINIC | Age: 74
End: 2024-11-12

## 2024-11-13 ENCOUNTER — APPOINTMENT (OUTPATIENT)
Dept: INTEGRATIVE MEDICINE | Facility: CLINIC | Age: 74
End: 2024-11-13

## 2024-11-27 ENCOUNTER — APPOINTMENT (OUTPATIENT)
Dept: INTEGRATIVE MEDICINE | Facility: CLINIC | Age: 74
End: 2024-11-27

## 2024-11-27 PROCEDURE — MASSPFU MASSAGE PACKAGE FOLLOW UP: Performed by: MASSAGE THERAPIST

## 2024-11-27 NOTE — PROGRESS NOTES
Massage Therapy Visit:     Isabella Mcgill was self-referred.    Condition of Client Subjective :  Patient ID: isabella Mcgill is a 74 y.o. female who presents for reason for visit of Relaxation massage.  HPI    Session Information  Description of present complaint: Stress, Muscle tension, Range of motion (ROM), Discomfort    Review of Systems    Objective        Physical Exam    Actions Assessment/Plan :    Massage Treatment  Patient Position: Table, Supine, Prone  Positioning Assistance: Did not need assistance, Pillow(s)/bolster under knees while supine, Pillow(s)/bolster under anles while prone  Massage Technique: Therapeutic massage, Nurturing touch, Myofascial release, Mobilization, Stretching, Relaxation massage, Soft tissue mobilization  Pressure Scale: 2 - Mild pressure, 3 - Medium pressure, 4 - Moderate pressure    Response:  Post-treatment Assessment  Patient Noted Improvement of the Following Symptoms: Muscle tension    Evaluation:   Massage Therapy Evaluation / Recommendation(s) / Follow-up  Post-Treatment Recommendation: Increase hydration  Follow-up: Follow up scheduled

## 2024-12-10 ENCOUNTER — APPOINTMENT (OUTPATIENT)
Dept: INTEGRATIVE MEDICINE | Facility: CLINIC | Age: 74
End: 2024-12-10

## 2024-12-10 PROCEDURE — MASSPFU MASSAGE PACKAGE FOLLOW UP: Performed by: MASSAGE THERAPIST

## 2024-12-10 NOTE — PROGRESS NOTES
Massage Therapy Visit:     Isabella Mcgill was self-referred.    Condition of Client Subjective :  Patient ID: isabella Mcgill is a 74 y.o. female who presents for reason for visit of Relaxation massage.  HPI    Session Information  Description of present complaint: Stress, Muscle tension, Range of motion (ROM), Discomfort    Review of Systems    Objective        Physical Exam    Actions Assessment/Plan :    Massage Treatment  Patient Position: Table, Supine, Prone  Positioning Assistance: Did not need assistance, Pillow(s)/bolster under knees while supine, Pillow(s)/bolster under anles while prone  Massage Technique: Therapeutic massage, Stretching, Soft tissue mobilization, Myofascial release, Relaxation massage  Pressure Scale: 2 - Mild pressure, 3 - Medium pressure, 4 - Moderate pressure    Response:  Post-treatment Assessment  Patient Noted Improvement of the Following Symptoms: Muscle tension    Evaluation:   Massage Therapy Evaluation / Recommendation(s) / Follow-up  Post-Treatment Recommendation: Increase hydration  Follow-up: Follow up scheduled

## 2024-12-23 ENCOUNTER — APPOINTMENT (OUTPATIENT)
Dept: INTEGRATIVE MEDICINE | Facility: CLINIC | Age: 74
End: 2024-12-23

## 2024-12-24 ENCOUNTER — APPOINTMENT (OUTPATIENT)
Dept: INTEGRATIVE MEDICINE | Facility: CLINIC | Age: 74
End: 2024-12-24

## 2025-01-15 ENCOUNTER — APPOINTMENT (OUTPATIENT)
Dept: INTEGRATIVE MEDICINE | Facility: CLINIC | Age: 75
End: 2025-01-15

## 2025-01-15 ENCOUNTER — APPOINTMENT (OUTPATIENT)
Dept: OBSTETRICS AND GYNECOLOGY | Facility: CLINIC | Age: 75
End: 2025-01-15
Payer: MEDICARE

## 2025-01-15 VITALS
WEIGHT: 164 LBS | BODY MASS INDEX: 24.29 KG/M2 | DIASTOLIC BLOOD PRESSURE: 60 MMHG | SYSTOLIC BLOOD PRESSURE: 112 MMHG | HEIGHT: 69 IN

## 2025-01-15 DIAGNOSIS — Z01.419 WELL WOMAN EXAM WITH ROUTINE GYNECOLOGICAL EXAM: Primary | ICD-10-CM

## 2025-01-15 DIAGNOSIS — N95.2 POSTMENOPAUSAL ATROPHIC VAGINITIS: ICD-10-CM

## 2025-01-15 PROCEDURE — MASSPFU MASSAGE PACKAGE FOLLOW UP: Performed by: MASSAGE THERAPIST

## 2025-01-15 PROCEDURE — 1036F TOBACCO NON-USER: CPT | Performed by: NURSE PRACTITIONER

## 2025-01-15 PROCEDURE — G0101 CA SCREEN;PELVIC/BREAST EXAM: HCPCS | Performed by: NURSE PRACTITIONER

## 2025-01-15 PROCEDURE — 1159F MED LIST DOCD IN RCRD: CPT | Performed by: NURSE PRACTITIONER

## 2025-01-15 PROCEDURE — 1126F AMNT PAIN NOTED NONE PRSNT: CPT | Performed by: NURSE PRACTITIONER

## 2025-01-15 RX ORDER — ESTRADIOL 10 UG/1
10 INSERT VAGINAL 2 TIMES WEEKLY
Qty: 24 TABLET | Refills: 3 | Status: SHIPPED | OUTPATIENT
Start: 2025-01-16

## 2025-01-15 ASSESSMENT — ENCOUNTER SYMPTOMS
GASTROINTESTINAL NEGATIVE: 0
PSYCHIATRIC NEGATIVE: 0
HEMATOLOGIC/LYMPHATIC NEGATIVE: 0
MUSCULOSKELETAL NEGATIVE: 0
NEUROLOGICAL NEGATIVE: 0
CONSTITUTIONAL NEGATIVE: 0
CARDIOVASCULAR NEGATIVE: 0
ENDOCRINE NEGATIVE: 0
RESPIRATORY NEGATIVE: 0
ALLERGIC/IMMUNOLOGIC NEGATIVE: 0
EYES NEGATIVE: 0

## 2025-01-15 ASSESSMENT — PAIN SCALES - GENERAL: PAINLEVEL_OUTOF10: 0-NO PAIN

## 2025-01-15 NOTE — PROGRESS NOTES
Subjective   Patient ID: Isabella Mcgill is a 74 y.o. female who presents for Vaginal Discharge.  HPI  Concerns:  - Pt was concerned abt vaginal discharge she had for 1 day a couple months ago  - Pt has not been sexually active in years, no concern for STI  - Pt denies vaginal odor, itching, vaginal bleeding, pain  - Vaginal hygiene: uses OTC vaginal wipe twice a week, and cleans with dove liquid soap    Pt requested her prescription be changed from the estradiol cream back to the estradiol 10 mcg vaginal tablet    Age at Menopause: 49  History of HT: initially for several years  History of bioidentical/otc: none  Postmenopausal bleeding: none  VMS: none  Mood changes: none  GMS: none  Urinary Incontinence: none, rarely  History of STI: none      FH of breast cancer: none  FH of ovarian cancer: none  FH of colon cancer: none  FH pancreatic cancer: none    Exercise: every day wt bearing, walking, swimming        Review of Systems    Objective   Physical Exam  Vitals and nursing note reviewed.   Constitutional:       Appearance: Normal appearance.   Cardiovascular:      Rate and Rhythm: Normal rate and regular rhythm.      Heart sounds: Normal heart sounds.   Pulmonary:      Effort: Pulmonary effort is normal.      Breath sounds: Normal breath sounds.   Chest:   Breasts:     Right: Normal.      Left: Normal.   Abdominal:      Tenderness: There is no abdominal tenderness.   Genitourinary:     General: Normal vulva.      Exam position: Lithotomy position.      Pubic Area: No rash.       Labia:         Right: No lesion.         Left: No lesion.       Urethra: No prolapse or urethral lesion.      Vagina: Normal.      Rectum: Normal.      Comments: Perineum normal   Skin:     General: Skin is warm and dry.   Neurological:      Mental Status: She is alert.   Psychiatric:         Attention and Perception: Attention normal.         Mood and Affect: Mood normal.         Speech: Speech normal.         Behavior: Behavior  normal.         Thought Content: Thought content normal.         Cognition and Memory: Cognition and memory normal.         Judgment: Judgment normal.         Assessment/Plan   Diagnoses and all orders for this visit:  Well woman exam with routine gynecological exam  Postmenopausal atrophic vaginitis  -     estradiol (Yuvafem) 10 mcg tablet vaginal tablet; Insert 1 tablet (10 mcg) into the vagina 2 times a week.    Vaginal discharge: no concerns visualized on exam. No concerns for STI or vaginitis.  Wellness visit: performed a physical exam.  Switched pt's prescription from the vaginal estradiol cream to vaginal estradiol tablet.  Messaged pt about BMD. Last one performed in 2020 was normal.       SRIDEVI TUCKER 01/15/25 8:50 AM

## 2025-01-16 NOTE — PROGRESS NOTES
Massage Therapy Visit:     Isabella Mcgill was self-referred.    Condition of Client Subjective :  Patient ID: isabella Mcgill is a 74 y.o. female who presents for reason for visit of Relaxation massage.  HPI    Session Information  Description of present complaint: Stress, Muscle tension, Chronic pain, Range of motion (ROM), Gait issues, Discomfort    Review of Systems    Objective        Physical Exam    Actions Assessment/Plan :    Massage Treatment  Patient Position: Table, Supine, Prone  Positioning Assistance: Did not need assistance, Pillow(s)/bolster under knees while supine, Pillow(s)/bolster under anles while prone  Massage Technique: Therapeutic massage, Superficial fascial release, Stretching, Soft tissue mobilization, Relaxation massage, Cranio-sacral therapy  Pressure Scale: 2 - Mild pressure, 3 - Medium pressure, 4 - Moderate pressure    Response:  Post-treatment Assessment  Patient Noted Improvement of the Following Symptoms: Muscle tension, ROM    Evaluation:   Massage Therapy Evaluation / Recommendation(s) / Follow-up  Post-Treatment Recommendation: Increase hydration  Follow-up: Follow up scheduled

## 2025-01-28 ENCOUNTER — APPOINTMENT (OUTPATIENT)
Dept: INTEGRATIVE MEDICINE | Facility: CLINIC | Age: 75
End: 2025-01-28
Payer: MEDICARE

## 2025-01-28 PROCEDURE — CYTAX SALES TAX CUYAHOGA COUNT: Performed by: MASSAGE THERAPIST

## 2025-01-28 PROCEDURE — MASSG60 MASSAGE 60 MINUTES: Performed by: MASSAGE THERAPIST

## 2025-01-28 NOTE — PROGRESS NOTES
Massage Therapy Visit:     Isabella Mcgill was self-referred.    Condition of Client Subjective :  Patient ID: isabella Mcgill is a 74 y.o. female who presents for reason for visit of Relaxation massage.  HPI    Session Information  Description of present complaint: Stress, Muscle tension, Range of motion (ROM), Gait issues, Discomfort    Review of Systems    Objective        Physical Exam    Actions Assessment/Plan :    Massage Treatment  Patient Position: Table, Supine, Prone  Positioning Assistance: Did not need assistance, Pillow(s)/bolster under knees while supine, Pillow(s)/bolster under anles while prone  Massage Technique: Therapeutic massage, Superficial fascial release, Stretching, Soft tissue mobilization, Relaxation massage  Pressure Scale: 2 - Mild pressure, 3 - Medium pressure, 4 - Moderate pressure    Response:  Post-treatment Assessment  Patient Noted Improvement of the Following Symptoms: Muscle tension, ROM    Evaluation:   Massage Therapy Evaluation / Recommendation(s) / Follow-up  Post-Treatment Recommendation: Increase hydration  Follow-up: Follow up scheduled

## 2025-02-10 ENCOUNTER — APPOINTMENT (OUTPATIENT)
Dept: INTEGRATIVE MEDICINE | Facility: CLINIC | Age: 75
End: 2025-02-10
Payer: MEDICARE

## 2025-02-10 PROCEDURE — CYTAX SALES TAX CUYAHOGA COUNT: Performed by: MASSAGE THERAPIST

## 2025-02-10 PROCEDURE — MASSG60 MASSAGE 60 MINUTES: Performed by: MASSAGE THERAPIST

## 2025-02-10 NOTE — PROGRESS NOTES
Massage Therapy Visit:     Isabella Mcgill was self-referred.    Condition of Client Subjective :  Patient ID: isabella Mcgill is a 74 y.o. female who presents for reason for visit of Muscle tension release.  HPI    Session Information  Description of present complaint: Stress, Muscle tension, Range of motion (ROM), Discomfort    Review of Systems    Objective        Physical Exam    Actions Assessment/Plan :    Massage Treatment  Patient Position: Table, Supine, Prone  Positioning Assistance: Pillow(s)/bolster under knees while supine, Did not need assistance, Pillow(s)/bolster under anles while prone  Massage Technique: Therapeutic massage, Superficial fascial release, Myofascial release, Soft tissue mobilization, Relaxation massage  Pressure Scale: 2 - Mild pressure, 3 - Medium pressure, 4 - Moderate pressure    Response:  Post-treatment Assessment  Patient Noted Improvement of the Following Symptoms: Muscle tension, ROM    Evaluation:   Massage Therapy Evaluation / Recommendation(s) / Follow-up  Post-Treatment Recommendation: Increase hydration  Follow-up: Follow up scheduled

## 2025-03-10 ENCOUNTER — APPOINTMENT (OUTPATIENT)
Dept: INTEGRATIVE MEDICINE | Facility: CLINIC | Age: 75
End: 2025-03-10
Payer: MEDICARE

## 2025-04-14 DIAGNOSIS — Z12.11 COLON CANCER SCREENING: ICD-10-CM

## 2025-04-14 RX ORDER — SODIUM PICOSULFATE, MAGNESIUM OXIDE, AND ANHYDROUS CITRIC ACID 12; 3.5; 1 G/175ML; G/175ML; MG/175ML
1 LIQUID ORAL SEE ADMIN INSTRUCTIONS
Qty: 350 ML | Refills: 0 | Status: SHIPPED | OUTPATIENT
Start: 2025-04-14

## 2025-06-04 ENCOUNTER — APPOINTMENT (OUTPATIENT)
Dept: GASTROENTEROLOGY | Facility: EXTERNAL LOCATION | Age: 75
End: 2025-06-04
Payer: MEDICARE

## 2025-06-04 DIAGNOSIS — Z12.11 SCREEN FOR COLON CANCER: Primary | ICD-10-CM

## 2025-06-04 DIAGNOSIS — Z86.0101 HISTORY OF ADENOMATOUS POLYP OF COLON: ICD-10-CM

## 2025-06-04 DIAGNOSIS — Z12.11 SCREEN FOR COLON CANCER: ICD-10-CM

## 2025-06-04 PROCEDURE — G0105 COLORECTAL SCRN; HI RISK IND: HCPCS | Performed by: INTERNAL MEDICINE

## 2025-09-19 ENCOUNTER — APPOINTMENT (OUTPATIENT)
Dept: DERMATOLOGY | Facility: CLINIC | Age: 75
End: 2025-09-19
Payer: MEDICARE